# Patient Record
Sex: FEMALE | Race: AMERICAN INDIAN OR ALASKA NATIVE | NOT HISPANIC OR LATINO | URBAN - METROPOLITAN AREA
[De-identification: names, ages, dates, MRNs, and addresses within clinical notes are randomized per-mention and may not be internally consistent; named-entity substitution may affect disease eponyms.]

---

## 2018-07-14 ENCOUNTER — OFFICE VISIT (OUTPATIENT)
Dept: URGENT CARE | Facility: URGENT CARE | Age: 68
End: 2018-07-14
Payer: COMMERCIAL

## 2018-07-14 VITALS
WEIGHT: 189 LBS | SYSTOLIC BLOOD PRESSURE: 125 MMHG | HEART RATE: 64 BPM | OXYGEN SATURATION: 97 % | BODY MASS INDEX: 31.45 KG/M2 | TEMPERATURE: 97.2 F | DIASTOLIC BLOOD PRESSURE: 62 MMHG | RESPIRATION RATE: 18 BRPM

## 2018-07-14 DIAGNOSIS — S81.811A LACERATION OF LOWER LEG, RIGHT, INITIAL ENCOUNTER: Primary | ICD-10-CM

## 2018-07-14 DIAGNOSIS — Z23 NEED FOR TD VACCINE: ICD-10-CM

## 2018-07-14 PROCEDURE — 12002 RPR S/N/AX/GEN/TRNK2.6-7.5CM: CPT | Performed by: PHYSICIAN ASSISTANT

## 2018-07-14 PROCEDURE — 90714 TD VACC NO PRESV 7 YRS+ IM: CPT | Performed by: PHYSICIAN ASSISTANT

## 2018-07-14 PROCEDURE — 90471 IMMUNIZATION ADMIN: CPT | Performed by: PHYSICIAN ASSISTANT

## 2018-07-14 RX ORDER — CEPHALEXIN 500 MG/1
500 CAPSULE ORAL 4 TIMES DAILY
Qty: 40 CAPSULE | Refills: 0 | Status: SHIPPED | OUTPATIENT
Start: 2018-07-14

## 2018-07-14 NOTE — PROGRESS NOTES
SUBJECTIVE:     Chief Complaint   Patient presents with     Laceration     Gash on the R leg  per pt x 1 day now      Dolores Veras is a 68 year old female who presents to the clinic with a laceration on the right lower leg sustained 1 day(s) ago.  This is a non-work related injury.    Mechanism of injury: walked into a watering can and lacerated the medial side of the ankle/distal tibia.    Associated symptoms: Denies numbness, weakness, or loss of function  Has had bleeding because of the NOAC treatment with xarelto.    Last tetanus booster within 10 years: No. Needs tetanus booster.     EXAM:   The patient appears today in alert,no apparent distress distress  VITALS: /62  Pulse 64  Temp 97.2  F (36.2  C) (Oral)  Resp 18  Wt 189 lb (85.7 kg)  SpO2 97%  Breastfeeding? No  BMI 31.45 kg/m2    Size of laceration: 5 centimeters  Characteristics of the laceration: active bleeding  Tendon function intact: yes  Sensation to light touch intact: yes  Pulses intact: yes  Picture included in patient's chart: no    Assessment:     Laceration of lower leg, right, initial encounter  Need for TD vaccine    PLAN:  PROCEDURE NOTE::  Wound was locally injected with 10 cc's of Lidocaine 1% plain  Prepped and draped in the usual sterile fashion  Laceration was closed using 7 3-0 nylon interrupted sutures  After care instructions:  Keep wound clean and dry for the next 24-48 hours  Sutures out in 10-12 days  Signs of infection discussed today  May return to work as long as wound is kept clean and dry    ASSESSMENT:  1. Laceration of lower leg, right, initial encounter    2. Need for TD vaccine        PLAN:  Orders Placed This Encounter     REPAIR INTERMED, WOUND TRUNK/ARM/LEG 2.6-7.5 CM     TD (ADULT, 7+) PRESERVE FREE     ADMIN 1st VACCINE     cephALEXin (KEFLEX) 500 MG capsule       Patient Instructions   Keep the area clean dry and protected  Rewrapped the dressing are reinforced if it bleeds through.  Elevate right  lower extremity above the level of the heart continuously over the next 24 hours.  Moderate activities so you do not elevate your heart rate over the next 48 hours.  Antibiotic as written.  Follow-up with primary care provider for reevaluation of wound and suture removal in 10-12 days.  Return to clinic in the interim signs or symptoms of infection should present.      Extremity Laceration: Stitches, Staples, or Tape  A laceration is a cut through the skin. If it is deep, it may require stitches or staples to close so it can heal. Minor cuts may be treated with surgical tape closures, or skin glue.  X-rays may be done if something may have entered the skin through the cut. You may also need a tetanus shot if you are not up to date on this vaccine.  Home care    Follow the healthcare provider s instructions on how to care for the cut.    Wash your hands with soap and warm water before and after caring for your wound. This is to help prevent infection.    Keep the wound clean and dry. If a bandage was applied and it becomes wet or dirty, replace it. Otherwise, leave it in place for the first 24 hours, then change it once a day or as directed.    If stitches or staples were used, clean the wound daily:  ? After removing the bandage, wash the area with soap and water. Use a wet cotton swab to loosen and remove any blood or crust that forms.  ? After cleaning, keep the wound clean and dry. Talk with your healthcare provider before putting any antibiotic ointment on the wound. Reapply the bandage.    You may remove the bandage to shower as usual after the first 24 hours, but don't soak the area in water (no swimming) until the stitches or staples are removed.    If surgical tape closures were used, keep the area clean and dry. If it becomes wet, blot it dry with a towel. Let the surgical tape fall off on its own.    The healthcare provider may prescribe an antibiotic cream or ointment to prevent infection. He or she may  also prescribe an antibiotic pill. Don't stop taking this medicine until you have finished it all or the provider tells you to stop.    The provider may also prescribe medicine for pain. Follow the instructions for taking these medicines.    Don't do activities that may reopen your wound.  Follow-up care  Follow up with your healthcare provider, or as advised. Most skin wounds heal within 10 days. But an infection may sometimes occur even with proper treatment. Check the wound daily for the signs of infection listed below. Stitches and staples should be removed within 7 to14 days. If surgical tape closures were used, you may remove them after 10 days if they have not fallen off by then.   When to seek medical advice  Call your healthcare provider right away if any of these occur:    Wound bleeding not controlled by direct pressure    Signs of infection, including increasing pain in the wound, increasing wound redness or swelling, or pus or bad odor coming from the wound    Fever of 100.4 F (38 C) or higher, or as directed by your healthcare provider    Stitches or staples come apart or fall out or surgical tape falls off before 7 days    Wound edges reopen    Wound changes colors    Numbness occurs around the wound     Decreased movement around the injured area  Date Last Reviewed: 7/1/2017 2000-2017 [employer] [facility]. [address] [place] [address], [place] [address]. All rights reserved. This information is not intended as a substitute for professional medical care. Always follow your healthcare professional's instructions.

## 2018-07-14 NOTE — MR AVS SNAPSHOT
After Visit Summary   7/14/2018    Dolores Veras    MRN: [id number]           Patient Information     Date Of Birth          1950        Visit Information        Provider Department      7/14/2018 10:30 AM Ray Camp PA-C [facility]        Today's Diagnoses     Laceration of lower leg, right, initial encounter    -  1      Care Instructions    Keep the area clean dry and protected  Rewrapped the dressing are reinforced if it bleeds through.  Elevate right lower extremity above the level of the heart continuously over the next 24 hours.  Moderate activities so you do not elevate your heart rate over the next 48 hours.  Antibiotic as written.  Follow-up with primary care provider for reevaluation of wound and suture removal in 10-12 days.  Return to clinic in the interim signs or symptoms of infection should present.      Extremity Laceration: Stitches, Staples, or Tape  A laceration is a cut through the skin. If it is deep, it may require stitches or staples to close so it can heal. Minor cuts may be treated with surgical tape closures, or skin glue.  X-rays may be done if something may have entered the skin through the cut. You may also need a tetanus shot if you are not up to date on this vaccine.  Home care    Follow the healthcare provider s instructions on how to care for the cut.    Wash your hands with soap and warm water before and after caring for your wound. This is to help prevent infection.    Keep the wound clean and dry. If a bandage was applied and it becomes wet or dirty, replace it. Otherwise, leave it in place for the first 24 hours, then change it once a day or as directed.    If stitches or staples were used, clean the wound daily:  ? After removing the bandage, wash the area with soap and water. Use a wet cotton swab to loosen and remove any blood or crust that forms.  ? After cleaning, keep the wound clean and dry. Talk with your healthcare provider  before putting any antibiotic ointment on the wound. Reapply the bandage.    You may remove the bandage to shower as usual after the first 24 hours, but don't soak the area in water (no swimming) until the stitches or staples are removed.    If surgical tape closures were used, keep the area clean and dry. If it becomes wet, blot it dry with a towel. Let the surgical tape fall off on its own.    The healthcare provider may prescribe an antibiotic cream or ointment to prevent infection. He or she may also prescribe an antibiotic pill. Don't stop taking this medicine until you have finished it all or the provider tells you to stop.    The provider may also prescribe medicine for pain. Follow the instructions for taking these medicines.    Don't do activities that may reopen your wound.  Follow-up care  Follow up with your healthcare provider, or as advised. Most skin wounds heal within 10 days. But an infection may sometimes occur even with proper treatment. Check the wound daily for the signs of infection listed below. Stitches and staples should be removed within 7 to14 days. If surgical tape closures were used, you may remove them after 10 days if they have not fallen off by then.   When to seek medical advice  Call your healthcare provider right away if any of these occur:    Wound bleeding not controlled by direct pressure    Signs of infection, including increasing pain in the wound, increasing wound redness or swelling, or pus or bad odor coming from the wound    Fever of 100.4 F (38 C) or higher, or as directed by your healthcare provider    Stitches or staples come apart or fall out or surgical tape falls off before 7 days    Wound edges reopen    Wound changes colors    Numbness occurs around the wound     Decreased movement around the injured area  Date Last Reviewed: 7/1/2017 2000-2017 [employer]. [address] [place] [address], [place] [address]. All rights reserved. This information is not  intended as a substitute for professional medical care. Always follow your healthcare professional's instructions.                Follow-ups after your visit        Follow-up notes from your care team     Return in about 12 days (around 7/26/2018).      Who to contact     If you have questions or need follow up information about today's clinic visit or your schedule please contact [facility] directly at [phone].  Normal or non-critical lab and imaging results will be communicated to you by MyChart, letter or phone within 4 business days after the clinic has received the results. If you do not hear from us within 7 days, please contact the clinic through MyChart or phone. If you have a critical or abnormal lab result, we will notify you by phone as soon as possible.  Submit refill requests through [employer] or call your pharmacy and they will forward the refill request to us. Please allow 3 business days for your refill to be completed.          Additional Information About Your Visit        Care EveryWhere ID     This is your Care EveryWhere ID. This could be used by other organizations to access your [place] medical records  [id number]        Your Vitals Were     Pulse Temperature Respirations Pulse Oximetry Breastfeeding? BMI (Body Mass Index)    64 97.2  F (36.2  C) (Oral) 18 97% No 31.45 kg/m2       Blood Pressure from Last 3 Encounters:   07/14/18 125/62   07/13/15 161/77    Weight from Last 3 Encounters:   07/14/18 189 lb (85.7 kg)   07/13/15 182 lb (82.6 kg)              We Performed the Following     REPAIR INTERMED, WOUND TRUNK/ARM/LEG 2.6-7.5 CM     TD (ADULT, 7+) PRESERVE FREE          Today's Medication Changes          These changes are accurate as of 7/14/18 12:25 PM.  If you have any questions, ask your nurse or doctor.               Start taking these medicines.        Dose/Directions    cephALEXin 500 MG capsule   Commonly known as:  KEFLEX   Used for:  Laceration of lower leg,  right, initial encounter   Started by:  Ray Camp PA-C        Dose:  500 mg   Take 1 capsule (500 mg) by mouth 4 times daily   Quantity:  40 capsule   Refills:  0            Where to get your medicines      These medications were sent to [place], [place] - [address]  [address] [facility], [address], [facility] [address]     Phone:  [phone]     cephALEXin 500 MG capsule                Primary Care Provider Office Phone # Fax #    [facility] [phone] [address] [facility] [address]        Equal Access to Services     [facility]: Hadii aad ku hadasho Soomaali, waaxda luqadaha, qaybta kaalmada adeegyada, waxconi nicolein hayaan adechandu dickinson . So [facility] [phone].    ATENCIÓN: Si habla español, tiene a vann disposición servicios gratuitos de asistencia lingüística. Llame al [phone].    We comply with applicable federal civil rights laws and [place] laws. We do not discriminate on the basis of race, color, national origin, age, disability, sex, sexual orientation, or gender identity.            Thank you!     Thank you for choosing [facility]  for your care. Our goal is always to provide you with excellent care. Hearing back from our patients is one way we can continue to improve our services. Please take a few minutes to complete the written survey that you may receive in the mail after your visit with us. Thank you!             Your Updated Medication List - Protect others around you: Learn how to safely use, store and throw away your medicines at www.disposemymeds.org.          This list is accurate as of 7/14/18 12:25 PM.  Always use your most recent med list.                   Brand Name Dispense Instructions for use Diagnosis    buPROPion 150 MG 24 hr tablet    WELLBUTRIN XL     Take 1 tablet by mouth daily        cephALEXin 500 MG capsule    KEFLEX    40 capsule    Take 1 capsule (500 mg) by mouth 4  times daily    Laceration of lower leg, right, initial encounter       ESTRACE VAGINAL 0.1 MG/GM cream   Generic drug:  estradiol      as needed        fluticasone 50 MCG/ACT spray    FLONASE     as needed        levothyroxine 50 MCG tablet    SYNTHROID/LEVOTHROID     Take 1 tablet by mouth daily        losartan 100 MG tablet    COZAAR     Take 1 tablet by mouth daily        simvastatin 10 MG tablet    ZOCOR     Take 1 tablet by mouth daily

## 2018-07-14 NOTE — PROGRESS NOTES
"  SUBJECTIVE:     HPI  Dolores Veras is a 68 year old female who presents to clinic today for the following health issues:  ***      Duration: ***    Description (location/character/radiation): ***    Intensity:  {mild,moderate,severe:356441}    Accompanying signs and symptoms: ***    History (similar episodes/previous evaluation): {.:444083::\"None\"}    Precipitating or alleviating factors: {.:071062::\"None\"}    Therapies tried and outcome: {.:867856::\"None\"}        Reviewed PMH.  There is no problem list on file for this patient.    Current Outpatient Prescriptions   Medication Sig Dispense Refill     buPROPion (WELLBUTRIN XL) 150 MG 24 hr tablet Take 1 tablet by mouth daily       ESTRACE VAGINAL 0.1 MG/GM vaginal cream as needed       fluticasone (FLONASE) 50 MCG/ACT nasal spray as needed  2     levothyroxine (SYNTHROID, LEVOTHROID) 50 MCG tablet Take 1 tablet by mouth daily       losartan (COZAAR) 100 MG tablet Take 1 tablet by mouth daily       simvastatin (ZOCOR) 10 MG tablet Take 1 tablet by mouth daily       No Known Allergies    ROS    /62  Pulse 64  Temp 97.2  F (36.2  C) (Oral)  Resp 18  Wt 189 lb (85.7 kg)  SpO2 97%  Breastfeeding? No  BMI 31.45 kg/m2  Physical Exam      Assessment/Plan:  There are no diagnoses linked to this encounter.      Catie Issa PA-C    "

## 2018-07-14 NOTE — NURSING NOTE
"Chief Complaint   Patient presents with     Laceration     Gash on the R leg  per pt x 1 day now        Initial /62  Pulse 64  Temp 97.2  F (36.2  C) (Oral)  Resp 18  Wt 189 lb (85.7 kg)  SpO2 97%  Breastfeeding? No  BMI 31.45 kg/m2 Estimated body mass index is 31.45 kg/(m^2) as calculated from the following:    Height as of 7/13/15: 5' 5\" (1.651 m).    Weight as of this encounter: 189 lb (85.7 kg).  Medication Reconciliation: complete      Ashlyn Goldberg MA    "

## 2018-07-14 NOTE — PATIENT INSTRUCTIONS
Keep the area clean dry and protected  Rewrapped the dressing are reinforced if it bleeds through.  Elevate right lower extremity above the level of the heart continuously over the next 24 hours.  Moderate activities so you do not elevate your heart rate over the next 48 hours.  Antibiotic as written.  Follow-up with primary care provider for reevaluation of wound and suture removal in 10-12 days.  Return to clinic in the interim signs or symptoms of infection should present.      Extremity Laceration: Stitches, Staples, or Tape  A laceration is a cut through the skin. If it is deep, it may require stitches or staples to close so it can heal. Minor cuts may be treated with surgical tape closures, or skin glue.  X-rays may be done if something may have entered the skin through the cut. You may also need a tetanus shot if you are not up to date on this vaccine.  Home care    Follow the healthcare provider s instructions on how to care for the cut.    Wash your hands with soap and warm water before and after caring for your wound. This is to help prevent infection.    Keep the wound clean and dry. If a bandage was applied and it becomes wet or dirty, replace it. Otherwise, leave it in place for the first 24 hours, then change it once a day or as directed.    If stitches or staples were used, clean the wound daily:  ? After removing the bandage, wash the area with soap and water. Use a wet cotton swab to loosen and remove any blood or crust that forms.  ? After cleaning, keep the wound clean and dry. Talk with your healthcare provider before putting any antibiotic ointment on the wound. Reapply the bandage.    You may remove the bandage to shower as usual after the first 24 hours, but don't soak the area in water (no swimming) until the stitches or staples are removed.    If surgical tape closures were used, keep the area clean and dry. If it becomes wet, blot it dry with a towel. Let the surgical tape fall off on its  own.    The healthcare provider may prescribe an antibiotic cream or ointment to prevent infection. He or she may also prescribe an antibiotic pill. Don't stop taking this medicine until you have finished it all or the provider tells you to stop.    The provider may also prescribe medicine for pain. Follow the instructions for taking these medicines.    Don't do activities that may reopen your wound.  Follow-up care  Follow up with your healthcare provider, or as advised. Most skin wounds heal within 10 days. But an infection may sometimes occur even with proper treatment. Check the wound daily for the signs of infection listed below. Stitches and staples should be removed within 7 to14 days. If surgical tape closures were used, you may remove them after 10 days if they have not fallen off by then.   When to seek medical advice  Call your healthcare provider right away if any of these occur:    Wound bleeding not controlled by direct pressure    Signs of infection, including increasing pain in the wound, increasing wound redness or swelling, or pus or bad odor coming from the wound    Fever of 100.4 F (38 C) or higher, or as directed by your healthcare provider    Stitches or staples come apart or fall out or surgical tape falls off before 7 days    Wound edges reopen    Wound changes colors    Numbness occurs around the wound     Decreased movement around the injured area  Date Last Reviewed: 7/1/2017 2000-2017 [employer]. [address] [place] [address], [place] [address]. All rights reserved. This information is not intended as a substitute for professional medical care. Always follow your healthcare professional's instructions.

## 2024-01-01 ENCOUNTER — OFFICE VISIT (OUTPATIENT)
Dept: URGENT CARE | Facility: URGENT CARE | Age: 74
End: 2024-01-01
Payer: COMMERCIAL

## 2024-01-01 VITALS
SYSTOLIC BLOOD PRESSURE: 132 MMHG | RESPIRATION RATE: 19 BRPM | OXYGEN SATURATION: 93 % | TEMPERATURE: 98.3 F | DIASTOLIC BLOOD PRESSURE: 76 MMHG | HEART RATE: 83 BPM

## 2024-01-01 DIAGNOSIS — H00.021 HORDEOLUM INTERNUM OF RIGHT UPPER EYELID: Primary | ICD-10-CM

## 2024-01-01 PROCEDURE — 99203 OFFICE O/P NEW LOW 30 MIN: CPT | Performed by: STUDENT IN AN ORGANIZED HEALTH CARE EDUCATION/TRAINING PROGRAM

## 2024-01-01 RX ORDER — OXYCODONE HYDROCHLORIDE 5 MG/1
5 TABLET ORAL PRN
COMMUNITY
Start: 2024-01-01

## 2024-01-01 RX ORDER — ERYTHROMYCIN 5 MG/G
OINTMENT OPHTHALMIC
Qty: 3.5 G | Refills: 0 | Status: SHIPPED | OUTPATIENT
Start: 2024-01-01 | End: 2024-01-01

## 2024-01-01 RX ORDER — GABAPENTIN 100 MG/1
200 CAPSULE ORAL 3 TIMES DAILY
COMMUNITY
Start: 2023-01-01 | End: 2024-10-04

## 2024-01-01 RX ORDER — TIOTROPIUM BROMIDE AND OLODATEROL 3.124; 2.736 UG/1; UG/1
2 SPRAY, METERED RESPIRATORY (INHALATION) DAILY
COMMUNITY
Start: 2024-01-01

## 2024-01-01 RX ORDER — METOPROLOL SUCCINATE 25 MG/1
1 TABLET, EXTENDED RELEASE ORAL DAILY
COMMUNITY
Start: 2024-01-01

## 2024-01-01 RX ORDER — ATORVASTATIN CALCIUM 40 MG/1
1 TABLET, FILM COATED ORAL DAILY
COMMUNITY
Start: 2024-01-01

## 2024-01-01 RX ORDER — ALBUTEROL SULFATE 90 UG/1
2 AEROSOL, METERED RESPIRATORY (INHALATION) EVERY 4 HOURS PRN
COMMUNITY

## 2024-01-01 ASSESSMENT — VISUAL ACUITY: OU: 1

## 2024-06-05 PROBLEM — M81.0 AGE-RELATED OSTEOPOROSIS WITHOUT CURRENT PATHOLOGICAL FRACTURE: Status: ACTIVE | Noted: 2019-04-05

## 2024-06-05 PROBLEM — I50.30 HEART FAILURE WITH PRESERVED EJECTION FRACTION (H): Chronic | Status: ACTIVE | Noted: 2023-01-12

## 2024-06-05 PROBLEM — R44.3 HALLUCINATIONS, UNSPECIFIED: Status: ACTIVE | Noted: 2023-03-23

## 2024-06-05 PROBLEM — R31.0 GROSS HEMATURIA: Status: ACTIVE | Noted: 2022-06-21

## 2024-06-05 PROBLEM — G47.33 OSA (OBSTRUCTIVE SLEEP APNEA): Status: ACTIVE | Noted: 2018-12-12

## 2024-06-05 PROBLEM — R73.03 PRE-DIABETES: Status: ACTIVE | Noted: 2019-03-16

## 2024-06-05 PROBLEM — I70.221 ATHEROSCLEROSIS OF NATIVE ARTERIES OF EXTREMITIES WITH REST PAIN, RIGHT LEG (H): Status: ACTIVE | Noted: 2024-01-01

## 2024-06-05 PROBLEM — Z91.81 HISTORY OF FALLING: Status: ACTIVE | Noted: 2023-03-23

## 2024-06-05 PROBLEM — C67.2 MALIGNANT NEOPLASM OF LATERAL WALL OF URINARY BLADDER (H): Status: ACTIVE | Noted: 2022-06-21

## 2024-06-05 PROBLEM — S78.111A ABOVE KNEE AMPUTATION OF RIGHT LOWER EXTREMITY (H): Status: ACTIVE | Noted: 2023-01-01

## 2024-06-05 PROBLEM — H90.3 SENSORINEURAL HEARING LOSS, BILATERAL: Status: ACTIVE | Noted: 2017-11-21

## 2024-06-05 PROBLEM — R26.81 UNSTEADINESS ON FEET: Status: ACTIVE | Noted: 2023-03-23

## 2024-06-05 PROBLEM — Z86.59 HISTORY OF POSTOPERATIVE DELIRIUM: Status: ACTIVE | Noted: 2024-01-01

## 2024-06-05 PROBLEM — Z99.89 DEPENDENCE ON OTHER ENABLING MACHINES AND DEVICES: Status: ACTIVE | Noted: 2023-03-23

## 2024-06-05 PROBLEM — I99.8 ISCHEMIC LEG: Status: ACTIVE | Noted: 2022-06-21

## 2024-06-05 PROBLEM — F41.9 ANXIETY: Status: ACTIVE | Noted: 2020-06-17

## 2024-06-05 PROBLEM — K76.6 PORTAL HYPERTENSION (H): Status: ACTIVE | Noted: 2017-11-11

## 2024-06-05 PROBLEM — I48.21 PERMANENT ATRIAL FIBRILLATION (H): Status: ACTIVE | Noted: 2017-11-11

## 2024-06-05 PROBLEM — R41.0 DELIRIUM: Status: ACTIVE | Noted: 2023-05-01

## 2024-06-05 PROBLEM — D50.9 ANEMIA, IRON DEFICIENCY: Status: ACTIVE | Noted: 2018-06-01

## 2024-06-05 PROBLEM — J43.9 PULMONARY EMPHYSEMA (H): Status: ACTIVE | Noted: 2017-05-24

## 2024-06-05 PROBLEM — T82.858A: Status: ACTIVE | Noted: 2022-08-18

## 2024-06-05 PROBLEM — I73.9 PERIPHERAL ARTERIAL DISEASE (H): Status: ACTIVE | Noted: 2022-03-18

## 2024-06-05 PROBLEM — G54.6 PHANTOM LIMB SYNDROME WITH PAIN (H): Status: ACTIVE | Noted: 2023-01-01

## 2024-06-05 PROBLEM — R94.31 PROLONGED QT INTERVAL: Status: ACTIVE | Noted: 2018-03-22

## 2024-06-05 PROBLEM — Z79.01 ENCOUNTER FOR CURRENT LONG-TERM USE OF ANTICOAGULANTS: Status: ACTIVE | Noted: 2022-06-15

## 2024-06-05 PROBLEM — Z99.81 DEPENDENCE ON SUPPLEMENTAL OXYGEN: Status: ACTIVE | Noted: 2023-05-02

## 2024-06-05 NOTE — PATIENT INSTRUCTIONS
Drainage can be facilitated with warm, moist compresses placed on the affected areas frequently (eg, for 5 to 10 minutes four times a day).   Massage and gentle wiping of the affected eyelid after the warm compress can also aid in drainage.  Patients should discontinue eye makeup to support healing

## 2024-06-05 NOTE — PROGRESS NOTES
"Assessment & Plan     Hordeolum internum of right upper eyelid  - erythromycin (ROMYCIN) 5 MG/GM ophthalmic ointment  Dispense: 3.5 g; Refill: 0    Hordeolum with signs of local infection. Exam reassuring against ocular cellulitis or preseptal cellulitis. No tenderness over lacrimal gland. Will treat with topical antibiotic, warm compresses and gentle massage 4x/daily. Reviewed return precautions and she was understanding of the plan at the time of discharge.    Return for if symptoms do not improve in 7 days.    Claritza Narayanan, DO  she/her  [facility]    Subjective     Doloresfaye Veras is a 74 year old female who presents to clinic today for the following health issues:    HPI    Eye Problem    Onset of symptoms was 3 day(s) ago.   Location: right eye   Mass on right eye upper  Itching, redness, swelling, pain and fluid draining yesterday  Has tried cold compress  No fever, new change in vision  Home PT had something wrong with her eyes but Dolores not sure what- saw 7d ago  No contact use, no possible foreign body  Thinks this might have happened a while ago but doesn't remember anything about it    No past medical history on file.    Allergies   Allergen Reactions    Acetaminophen Other (See Comments)     Avoid if possible due to Hep C    Blood-Group Specific Substance Other (See Comments)     Patient has a Cold autoantibody. Blood products may be delayed. Draw patient 24 hours prior to transfusion. For [employer] testing, draw one red top and two purple top tubes for all Type and Screen orders.     Patient has a Cold Auto antibody. Blood products may be delayed. Draw patient 24 hours prior to transfusion.    Hydromorphone Other (See Comments)     \"Makes me go crazy\"     Current Outpatient Medications   Medication Sig Dispense Refill    albuterol (PROAIR HFA/PROVENTIL HFA/VENTOLIN HFA) 108 (90 Base) MCG/ACT inhaler Inhale 2 puffs into the lungs every 4 hours as needed for wheezing      " atorvastatin (LIPITOR) 40 MG tablet Take 1 tablet by mouth daily      buPROPion (WELLBUTRIN XL) 150 MG 24 hr tablet Take 1 tablet by mouth daily      erythromycin (ROMYCIN) 5 MG/GM ophthalmic ointment Apply 1 cm ribbon in affected eye(s) four times a day. 3.5 g 0    gabapentin (NEURONTIN) 100 MG capsule Take 200 mg by mouth 3 times daily      levothyroxine (SYNTHROID, LEVOTHROID) 50 MCG tablet Take 1 tablet by mouth daily      losartan (COZAAR) 100 MG tablet Take 1 tablet by mouth daily      metoprolol succinate ER (TOPROL XL) 25 MG 24 hr tablet Take 1 tablet by mouth daily      omeprazole (PRILOSEC) 20 MG DR capsule Take 20 mg by mouth daily      oxyCODONE (ROXICODONE) 5 MG tablet Take 5 mg by mouth as needed      rivaroxaban ANTICOAGULANT (XARELTO) 20 MG TABS tablet Take 20 mg by mouth daily (with dinner)      simvastatin (ZOCOR) 10 MG tablet Take 1 tablet by mouth daily      STIOLTO RESPIMAT 2.5-2.5 MCG/ACT AERS Inhale 2 puffs into the lungs daily      cephALEXin (KEFLEX) 500 MG capsule Take 1 capsule (500 mg) by mouth 4 times daily (Patient not taking: Reported on 6/5/2024) 40 capsule 0    ESTRACE VAGINAL 0.1 MG/GM vaginal cream as needed (Patient not taking: Reported on 6/5/2024)      fluticasone (FLONASE) 50 MCG/ACT nasal spray as needed (Patient not taking: Reported on 6/5/2024)  2     No current facility-administered medications for this visit.      Review of Systems  Constitutional, HEENT, cardiovascular, pulmonary, gi and gu systems are negative, except as otherwise noted.      Objective    /76 (BP Location: Left arm, Cuff Size: Adult Regular)   Pulse 83   Temp 98.3  F (36.8  C) (Tympanic)   Resp 19   SpO2 93%     Physical Exam  Vitals (pt reports baseline O2 is 92-93%) reviewed.   Eyes:      General: Vision grossly intact. No allergic shiner or visual field deficit.        Right eye: Discharge (+ stye with white pus and yellow discharge at lacrimal gland) present. No hordeolum.         Left eye:  No hordeolum.      Extraocular Movements: Extraocular movements intact.      Conjunctiva/sclera:      Right eye: Right conjunctiva is injected. Exudate present.        Comments: Swollen and erythematous R upper lid   Pulmonary:      Effort: Pulmonary effort is normal. No respiratory distress.   Musculoskeletal:      Comments: R ATK amputation   Neurological:      Mental Status: She is alert and oriented to person, place, and time.      Comments: In wheelchair            The use of [employer] services may have been used to construct the content in this note; any grammatical or spelling errors are non-intentional. Please contact the author of this note directly if you are in need of any clarification.